# Patient Record
Sex: MALE | Race: WHITE | NOT HISPANIC OR LATINO | RURAL
[De-identification: names, ages, dates, MRNs, and addresses within clinical notes are randomized per-mention and may not be internally consistent; named-entity substitution may affect disease eponyms.]

---

## 2021-02-10 ENCOUNTER — OFFICE VISIT (OUTPATIENT)
Dept: RURAL CLINIC 8 | Facility: CLINIC | Age: 76
End: 2021-02-10
Payer: MEDICARE

## 2021-02-10 ENCOUNTER — WEB ENCOUNTER (OUTPATIENT)
Dept: RURAL CLINIC 8 | Facility: CLINIC | Age: 76
End: 2021-02-10

## 2021-02-10 DIAGNOSIS — R19.4 CHANGE IN BOWEL HABITS: ICD-10-CM

## 2021-02-10 DIAGNOSIS — R10.9 ABDOMINAL PAIN: ICD-10-CM

## 2021-02-10 DIAGNOSIS — K21.9 GERD: ICD-10-CM

## 2021-02-10 DIAGNOSIS — R19.7 DIARRHEA: ICD-10-CM

## 2021-02-10 DIAGNOSIS — Z90.49 HISTORY OF CHOLECYSTECTOMY: ICD-10-CM

## 2021-02-10 DIAGNOSIS — Z87.19 HISTORY OF GI BLEED: ICD-10-CM

## 2021-02-10 DIAGNOSIS — Z79.1 NSAID LONG-TERM USE: ICD-10-CM

## 2021-02-10 DIAGNOSIS — M54.9 CHRONIC BACK PAIN: ICD-10-CM

## 2021-02-10 PROBLEM — 235595009 GASTROESOPHAGEAL REFLUX DISEASE: Status: ACTIVE | Noted: 2021-02-10

## 2021-02-10 PROBLEM — 428882003 HISTORY OF CHOLECYSTECTOMY: Status: ACTIVE | Noted: 2021-02-10

## 2021-02-10 PROCEDURE — G8427 DOCREV CUR MEDS BY ELIG CLIN: HCPCS | Performed by: INTERNAL MEDICINE

## 2021-02-10 PROCEDURE — G8482 FLU IMMUNIZE ORDER/ADMIN: HCPCS | Performed by: INTERNAL MEDICINE

## 2021-02-10 PROCEDURE — 99203 OFFICE O/P NEW LOW 30 MIN: CPT | Performed by: INTERNAL MEDICINE

## 2021-02-10 PROCEDURE — G9903 PT SCRN TBCO ID AS NON USER: HCPCS | Performed by: INTERNAL MEDICINE

## 2021-02-10 PROCEDURE — G8417 CALC BMI ABV UP PARAM F/U: HCPCS | Performed by: INTERNAL MEDICINE

## 2021-02-10 RX ORDER — LOSARTAN POTASSIUM 100 MG/1
1 TABLET TABLET ORAL ONCE A DAY
Status: ACTIVE | COMMUNITY

## 2021-02-10 RX ORDER — TADALAFIL 5 MG/1
1 TABLET AS NEEDED TABLET, FILM COATED ORAL ONCE A DAY
Status: ACTIVE | COMMUNITY

## 2021-02-10 RX ORDER — METRONIDAZOLE 7.5 MG/G
1 APPLICATION CREAM TOPICAL TWICE A DAY
Status: ACTIVE | COMMUNITY

## 2021-02-10 RX ORDER — FLUTICASONE PROPIONATE 50 UG/1
1 SPRAY IN EACH NOSTRIL SPRAY, METERED NASAL ONCE A DAY
Status: ACTIVE | COMMUNITY

## 2021-02-10 RX ORDER — AZELASTINE HYDROCHLORIDE 137 UG/1
1 PUFF IN EACH NOSTRIL SPRAY, METERED NASAL TWICE A DAY
Status: ACTIVE | COMMUNITY

## 2021-02-10 RX ORDER — FLECAINIDE ACETATE 50 MG/1
AS DIRECTED TABLET ORAL
Status: ACTIVE | COMMUNITY

## 2021-02-10 RX ORDER — AMLODIPINE BESYLATE 10 MG/1
1 TABLET TABLET ORAL ONCE A DAY
Status: ACTIVE | COMMUNITY

## 2021-02-10 RX ORDER — CETIRIZINE HYDROCHLORIDE 10 MG/1
1 TABLET TABLET, FILM COATED ORAL ONCE A DAY
Status: ACTIVE | COMMUNITY

## 2021-02-10 RX ORDER — PANTOPRAZOLE SODIUM 40 MG/1
1 TABLET TABLET, DELAYED RELEASE ORAL ONCE A DAY
Status: ACTIVE | COMMUNITY

## 2021-02-10 RX ORDER — ACETAMINOPHEN 325 MG
1 TABLET AS NEEDED TABLET ORAL
Status: ACTIVE | COMMUNITY

## 2021-02-10 RX ORDER — DUTASTERIDE 0.5 MG/1
1 CAPSULE CAPSULE, LIQUID FILLED ORAL ONCE A DAY
Status: ACTIVE | COMMUNITY

## 2021-02-10 RX ORDER — MELOXICAM 7.5 MG/1
1 TABLET TABLET ORAL ONCE A DAY
Status: ACTIVE | COMMUNITY

## 2021-02-10 RX ORDER — CLONAZEPAM 0.5 MG/1
1 TABLET AT BEDTIME TABLET ORAL ONCE A DAY
Status: ACTIVE | COMMUNITY

## 2021-02-10 NOTE — HPI-TODAY'S VISIT:
Pt comes to the office today on referral from Dr. Eng. Pt reports that he has been having issues with diarrhea. He tells me that he has had GI issues "most of my life". He says that years ago he would take antispasm medications.  He then took some metamucil for several years. He says that over the last several months, he has been taking mobic intermittently for back pain.  - He describes having a normal stool for several days and then he can get up and have a normal stool and then it progresses to diarrhea. He does have history of ccx for biliary dyskinesia. he says that there is lots of debris and mucous in the stool that has happened previously. one day last week he had cramps and was constipated. he says that there is 50/50 diarrhea and rnomal. no nocturnal stool. he has had some episodes of incontinence b/c he could not get to the bathroom in time. - recent colonoscopy a couple of years ago and not due. he thinks he has had some polyps removed.

## 2021-02-14 ENCOUNTER — LAB OUTSIDE AN ENCOUNTER (OUTPATIENT)
Dept: URBAN - METROPOLITAN AREA CLINIC 105 | Facility: CLINIC | Age: 76
End: 2021-02-14

## 2021-03-01 LAB
C. DIFFICILE TOXIN A/B, STOOL - QDX: (no result)
GASTROINTESTINAL PATHOGEN: (no result)
PANCREATICELASTASE ELISA, STOOL: (no result)

## 2021-03-02 ENCOUNTER — TELEPHONE ENCOUNTER (OUTPATIENT)
Dept: URBAN - METROPOLITAN AREA CLINIC 105 | Facility: CLINIC | Age: 76
End: 2021-03-02

## 2021-03-02 RX ORDER — COLESEVELAM HYDROCHLORIDE 625 MG/1
2 TABLETS TABLET, FILM COATED ORAL QHS
Qty: 60 | Refills: 4 | OUTPATIENT
Start: 2021-03-02

## 2021-10-02 ENCOUNTER — ERX REFILL RESPONSE (OUTPATIENT)
Dept: RURAL CLINIC 2 | Facility: CLINIC | Age: 76
End: 2021-10-02

## 2021-10-02 RX ORDER — COLESEVELAM HYDROCHLORIDE 625 MG/1
2 TABLETS TABLET, FILM COATED ORAL QHS
Qty: 60 | Refills: 4 | OUTPATIENT

## 2021-10-02 RX ORDER — COLESEVELAM HCL 625 MG/1
TAKE TWO TABLETS BY MOUTH AT BEDTIME TABLET, FILM COATED ORAL
Qty: 60 TABLET | Refills: 2 | OUTPATIENT

## 2021-12-14 ENCOUNTER — TELEPHONE ENCOUNTER (OUTPATIENT)
Dept: URBAN - METROPOLITAN AREA CLINIC 92 | Facility: CLINIC | Age: 76
End: 2021-12-14

## 2021-12-14 RX ORDER — COLESEVELAM HCL 625 MG/1
TAKE TWO TABLETS BY MOUTH AT BEDTIME TABLET, FILM COATED ORAL
Qty: 60 TABLET | Refills: 2

## 2021-12-15 ENCOUNTER — TELEPHONE ENCOUNTER (OUTPATIENT)
Dept: URBAN - METROPOLITAN AREA CLINIC 92 | Facility: CLINIC | Age: 76
End: 2021-12-15

## 2021-12-15 RX ORDER — COLESEVELAM HCL 625 MG/1
TAKE TWO TABLETS BY MOUTH AT BEDTIME TABLET, FILM COATED ORAL ONCE A DAY
Qty: 90 | Refills: 2

## 2022-11-29 ENCOUNTER — ERX REFILL RESPONSE (OUTPATIENT)
Dept: RURAL CLINIC 2 | Facility: CLINIC | Age: 77
End: 2022-11-29

## 2022-11-29 RX ORDER — COLESEVELAM HCL 625 MG/1
TWO TABLETS TABLET, FILM COATED ORAL
Qty: 180 | Refills: 0 | OUTPATIENT

## 2022-11-29 RX ORDER — COLESEVELAM HCL 625 MG/1
TAKE TWO TABLETS BY MOUTH AT BEDTIME TABLET, FILM COATED ORAL ONCE A DAY
Qty: 90 | Refills: 2 | OUTPATIENT

## 2024-03-19 ENCOUNTER — LAB (OUTPATIENT)
Dept: RURAL CLINIC 2 | Facility: CLINIC | Age: 79
End: 2024-03-19

## 2024-03-19 ENCOUNTER — OV NP (OUTPATIENT)
Dept: RURAL CLINIC 2 | Facility: CLINIC | Age: 79
End: 2024-03-19
Payer: MEDICARE

## 2024-03-19 VITALS
HEART RATE: 70 BPM | DIASTOLIC BLOOD PRESSURE: 115 MMHG | SYSTOLIC BLOOD PRESSURE: 160 MMHG | BODY MASS INDEX: 27.4 KG/M2 | TEMPERATURE: 96.8 F | HEIGHT: 69 IN | WEIGHT: 185 LBS

## 2024-03-19 DIAGNOSIS — Z95.0 CARDIAC PACEMAKER: ICD-10-CM

## 2024-03-19 DIAGNOSIS — K90.89 BILE SALT-INDUCED DIARRHEA: ICD-10-CM

## 2024-03-19 DIAGNOSIS — Z86.010 PERSONAL HISTORY OF COLONIC POLYPS: ICD-10-CM

## 2024-03-19 DIAGNOSIS — I49.8 ARRHYTHMIA, ATRIAL: ICD-10-CM

## 2024-03-19 PROBLEM — 361137007: Status: ACTIVE | Noted: 2024-03-19

## 2024-03-19 PROBLEM — 69980003: Status: ACTIVE | Noted: 2024-03-19

## 2024-03-19 PROBLEM — 441509002: Status: ACTIVE | Noted: 2024-03-19

## 2024-03-19 PROBLEM — 428283002: Status: ACTIVE | Noted: 2024-03-19

## 2024-03-19 PROBLEM — 37389005: Status: ACTIVE | Noted: 2024-03-19

## 2024-03-19 PROCEDURE — 99204 OFFICE O/P NEW MOD 45 MIN: CPT | Performed by: NURSE PRACTITIONER

## 2024-03-19 RX ORDER — ACETAMINOPHEN 325 MG
1 TABLET AS NEEDED TABLET ORAL
Status: ACTIVE | COMMUNITY

## 2024-03-19 RX ORDER — COLESEVELAM HCL 625 MG/1
TWO TABLETS TABLET, FILM COATED ORAL
Qty: 180 | Refills: 0 | Status: DISCONTINUED | COMMUNITY

## 2024-03-19 RX ORDER — CARVEDILOL 6.25 MG/1
1 TABLET WITH FOOD TABLET, FILM COATED ORAL TWICE A DAY
Status: ACTIVE | COMMUNITY

## 2024-03-19 RX ORDER — LOSARTAN POTASSIUM 100 MG/1
1 TABLET TABLET ORAL ONCE A DAY
Status: ACTIVE | COMMUNITY

## 2024-03-19 RX ORDER — TADALAFIL 5 MG/1
1 TABLET AS NEEDED TABLET, FILM COATED ORAL ONCE A DAY
Status: ACTIVE | COMMUNITY

## 2024-03-19 RX ORDER — CLONAZEPAM 0.5 MG/1
1 TABLET AT BEDTIME TABLET ORAL ONCE A DAY
Status: ACTIVE | COMMUNITY

## 2024-03-19 RX ORDER — MONTELUKAST 10 MG/1
1 TABLET TABLET, FILM COATED ORAL ONCE A DAY
Status: ACTIVE | COMMUNITY

## 2024-03-19 RX ORDER — COLESEVELAM HYDROCHLORIDE 625 MG/1
1/2 TABLET WITH MEALS TABLET, FILM COATED ORAL DAILY
Status: ACTIVE | COMMUNITY

## 2024-03-19 RX ORDER — FLECAINIDE ACETATE 50 MG/1
AS DIRECTED TABLET ORAL
Status: DISCONTINUED | COMMUNITY

## 2024-03-19 RX ORDER — SODIUM, POTASSIUM,MAG SULFATES 17.5-3.13G
354 ML SOLUTION, RECONSTITUTED, ORAL ORAL 2
Qty: 1 | Refills: 0 | OUTPATIENT
Start: 2024-03-19 | End: 2024-03-20

## 2024-03-19 RX ORDER — PANTOPRAZOLE SODIUM 40 MG/1
1 TABLET TABLET, DELAYED RELEASE ORAL ONCE A DAY
Status: ACTIVE | COMMUNITY

## 2024-03-19 RX ORDER — AZELASTINE HYDROCHLORIDE 137 UG/1
1 PUFF IN EACH NOSTRIL SPRAY, METERED NASAL TWICE A DAY
Status: ACTIVE | COMMUNITY

## 2024-03-19 RX ORDER — AMLODIPINE BESYLATE 10 MG/1
1 TABLET TABLET ORAL ONCE A DAY
Status: DISCONTINUED | COMMUNITY

## 2024-03-19 RX ORDER — DUTASTERIDE 0.5 MG/1
1 CAPSULE CAPSULE, LIQUID FILLED ORAL ONCE A DAY
Status: ACTIVE | COMMUNITY

## 2024-03-19 RX ORDER — METRONIDAZOLE 7.5 MG/G
1 APPLICATION CREAM TOPICAL TWICE A DAY
Status: ACTIVE | COMMUNITY

## 2024-03-19 RX ORDER — CETIRIZINE HYDROCHLORIDE 10 MG/1
1 TABLET TABLET, FILM COATED ORAL PRN
Status: ACTIVE | COMMUNITY

## 2024-03-19 RX ORDER — MELOXICAM 15 MG/1
1 TABLET TABLET ORAL PRN
Status: ACTIVE | COMMUNITY

## 2024-03-19 RX ORDER — FLUTICASONE PROPIONATE 50 UG/1
1 SPRAY IN EACH NOSTRIL SPRAY, METERED NASAL ONCE A DAY
Status: ACTIVE | COMMUNITY

## 2024-03-19 NOTE — HPI-ZZZTODAY'S VISIT
The patient who is known to our practice presents on referral from Dr. Blanca Gilmore for the evaluation of a colonoscopy for history of colon polyps.  A copy of this document to be sent to the referring provider.  His previous colonoscopy was completed in 2019 with findings of colon polyps.  Family history is negative for colon cancer. .  He is currently feeling well and denies abdominal pain, change in bowels or rectal bleeding.  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  He occasionally will adjust the dose for effectiveness.  Past medical history of cardiac pacemaker and cardiac arrhythmia.